# Patient Record
(demographics unavailable — no encounter records)

---

## 2021-05-06 NOTE — NUR
Ambulatory in Day SurgeryBair Paws warming gown applied.  History, Chart,
Medications and Allergies reviewed before start of procedure.Lungs clear T/O
to Auscultation.  Patient confirms NPO status and agrees with scheduled
surgery.  Pre-Op teaching done. Pt verbalizes understanding.  Patient States
Post-Procedure ride home has been arranged.